# Patient Record
(demographics unavailable — no encounter records)

---
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
## 2017-03-15 NOTE — ER DOCUMENT REPORT
ED Medical Screen (RME)





- General


Stated Complaint: VOMITING


Notes: 


30 yo female c/o epigastric pain and vomiting x 30 minutes.  + hx/o lupus


pt actively vomiting.

## 2017-04-04 NOTE — ER DOCUMENT REPORT
ED Seizure





- General


Mode of Arrival: Medic


Information source: Patient, Relative - 





- HPI


Patient complains to provider of: First seizure


Time of onset: 0430


Continued on arrival to ED: No


Episode witnessed (by whom): Yes - 


Preceding symptoms/context: Recent drug use, Changed meds or dosage, Other - 

Took tramadol


Character of seizure: No: Incontinent bladder





<JANETTE SCALES - Last Filed: 04/04/17 09:24>





<LISA CABA - Last Filed: 04/04/17 21:19>





- General


Chief Complaint: Passed Out Prior to Arrival


Stated Complaint: ALTERED MENTAL STATUS


Notes: 


Patient is a 29-year-old female presenting to the emergency department via EMS 

after having a possible seizure at approximately 0430 this morning.  Patient's 

 states that he awoke to a bunch of banging in the bathroom, and he went 

to check on the patient and she began to convulse, sliding down the door with 

her hands curled.  Patient's  states that she was turning blue and her 

tongue was sticking out.  Patient's  called EMS.  Patient's  

states that when she stopped convulsing, she was sleeping, and then she began 

to convulse one more time.  At that point EMS showed up and brought her in.  

Patient's  states that while she was lying on the bathroom floor,  she 

had some blood coming out of her mouth that is now dried on her arm.  Patient 

now states that she feels very tired, and she has been having bilateral leg 

swelling and midsternal chest pain.  Patient states that she just moved here 

from [place], and she just started at a new pain management doctor for her 

rheumatoid arthritis and lupus.  Patient was put on Neurontin, steroid, and 

this morning she took a tramadol.  Patient states that the only thing she 

remembers about this morning is that she woke up to do her makeup and then the 

next thing she remembers is that she was in the ambulance. (JANETTE SCALES)





- Related Data


Allergies/Adverse Reactions: 


 





Penicillins Allergy (Verified 03/15/17 16:15)


 


prochlorperazine [From Compazine] Allergy (Verified 03/15/17 16:15)


 


promethazine [From Phenergan] Allergy (Verified 03/15/17 16:15)


 


Sulfa (Sulfonamide Antibiotics) Allergy (Verified 03/15/17 16:15)


 











Past Medical History





- General


Information source: Patient, Relative - Spouse





- Social History


Smoking Status: Unknown if Ever Smoked


Lives with: Family


Family History: Reviewed & Not Pertinent


Renal/ Medical History: Denies: Hx Peritoneal Dialysis


Musculoskeltal Medical History: Reports Hx Arthritis - RA


Surgical Hx: Negative





<JANETTE SCALES - Last Filed: 04/04/17 09:24>





Review of Systems





- Review of Systems


Constitutional: No symptoms reported


EENT: No symptoms reported


Cardiovascular: See HPI, Chest pain


Respiratory: No symptoms reported


Gastrointestinal: No symptoms reported


Genitourinary: No symptoms reported.  denies: Incontinence


Female Genitourinary: No symptoms reported


Musculoskeletal: No symptoms reported


Skin: No symptoms reported


Hematologic/Lymphatic: No symptoms reported


Neurological/Psychological: See HPI, Seizure


-: Yes All other systems reviewed and negative





<JANETTE SCALES - Last Filed: 04/04/17 09:24>





Physical Exam





- General


General appearance: Alert





- HEENT


Head: Normocephalic, Atraumatic


Eyes: Normal


Pupils: PERRL





- Respiratory


Respiratory status: No respiratory distress


Chest status: Nontender


Breath sounds: Normal


Chest palpation: Normal





- Cardiovascular


Rhythm: Regular


Heart sounds: Normal auscultation


Murmur: No





- Abdominal


Inspection: Obese


Distension: No distension


Bowel sounds: Normal


Tenderness: Nontender


Organomegaly: No organomegaly





- Back


Back: Normal, Nontender





- Extremities


General upper extremity: Normal inspection, Nontender


General lower extremity: Normal inspection, Nontender





- Neurological


Neuro grossly intact: Yes


Cognition: Normal


Orientation: AAOx4


Sim Coma Scale Eye Opening: Spontaneous


Sim Coma Scale Verbal: Oriented


Frost Coma Scale Motor: Obeys Commands


Sim Coma Scale Total: 15


Speech: Normal





- Psychological


Associated symptoms: Normal affect, Normal mood





- Skin


Skin Temperature: Warm


Skin Moisture: Dry


Skin Color: Normal





<JANETTE SCALES - Last Filed: 04/04/17 09:24>





Course





- Laboratory


Result Diagrams: 


 04/04/17 05:57





 04/04/17 05:57





<JANETTE SCALES - Last Filed: 04/04/17 09:24>





- Laboratory


Result Diagrams: 


 04/04/17 05:57





 04/04/17 05:57





<LISA CABA - Last Filed: 04/04/17 21:19>





- Re-evaluation


Re-evalutation: 


04/04/17 08:30


I personally performed the services described in the documentation, reviewed 

and edited the documentation which was dictated to my scribe in my presence, 

and it accurately records my words and actions.





states she recently moved here from out of town is out of all of her chronic 

medications for lupus chronic pain and rheumatoid arthritis a local primary 

care physician gave her some Ultram the other day and is sending her to a pain 

 which she has an appointment for. She describes an event 

that took place this morning which is somewhat inconclusive based on the 

history she says she was getting ready to put her makeup on and neck he she 

remembers is being in the ambulance.  states he heard her banging on the 

wall to get his attention and we realized she was in the bathroom he came to 

get her. He said she was sliding down the wall having potential seizure-like 

activity she was not incontinent of urine or stool. EMS reports snoring 

possibly postictal. She does not have a history of a seizure disorder told her 

stop taking the Ultram as it can predispose to it. CT of head CT of the neck 

negative acute laboratory evaluation is all negative for acute pathology with 

the exception of a urinary tract infection which gave her Macrobid for. She is 

requesting Dilaudid for headache explained to her that we don't use Dilaudid 

for headaches in the emergency department nor do we treat chronic pain in this 

emergency department going forward have her follow-up with a primary care 

physician treat her with antibiotics for urinary tract infection given the name 

and number to a neurologist to follow up with his well. She is to follow up 

with these physicians in the next 3-4 days and discussed reasons Yudelka return 

sooner





04/04/17 08:43


Patient reports the Macrobid doesn't work for her chronic UTIs prefers Keflex 

switch over to Keflex she lists an allergy to penicillin but states she can 

take Keflex. (LISA CABA)





- Vital Signs


Vital signs: 


 











Temp Pulse Resp BP Pulse Ox


 


 98.6 F   139 H  16   106/62   95 


 


 04/04/17 08:30  04/04/17 05:41  04/04/17 08:30  04/04/17 08:30  04/04/17 08:30














- Laboratory


Laboratory results interpreted by me: 


 











  04/04/17 04/04/17 04/04/17





  05:57 05:57 07:43


 


WBC  12.4 H  


 


Seg Neutrophils %  80.0 H  


 


Lymphocytes %  12.2 L  


 


Absolute Neutrophils  9.9 H  


 


Sodium   145.8 H 


 


Glucose   127 H 


 


Calcium   8.3 L 


 


AST   13 L 


 


Urine Protein    100 H


 


Urine Blood    LARGE H


 


Ur Leukocyte Esterase    TRACE H


 


Urine Ascorbic Acid    40 H














Discharge





<JANETTE SCALES - Last Filed: 04/04/17 09:24>





<LISA CABA - Last Filed: 04/04/17 21:19>





- Discharge


Clinical Impression: 


 altered mental status resolved, cephalgia





Urinary tract infection


Qualifiers:


 Urinary tract infection type: acute cystitis Hematuria presence: without 

hematuria Qualified Code(s): N30.00 - Acute cystitis without hematuria





Chronic pain


Qualifiers:


 Chronic pain type: chronic pain syndrome Qualified Code(s): G89.4 - Chronic 

pain syndrome





Condition: Stable


Disposition: HOME, SELF-CARE


Instructions:  Urinary Tract Infection ([facility])


Additional Instructions: 


possible Seizure








     Seizures may be due to drugs and alcohol, strokes, brain injury, or 

infection.  In the most common form of epilepsy, no cause can be found.  You 

will require further evaluation to determine if you have a seizure , and to 

determine whether anti-seizure medication is required.  This follow-up testing 

is important, so please call us if you encounter problems with scheduling of 

tests or appointments.


     YOU SHOULD NOT DRIVE until released to do so by your physician. The law 

requires that seizures be reported to the 's license bureau--a seizure 

while driving could be catastrophic.


     Call the doctor if seizures recur, or if you develop new symptoms such as 

fever, severe headache, stiff neck, confusion or increasing sleepiness, 

weakness or numbness, or visual problems.


Stop Ultram





Urinary Tract Infection





     Your evaluation indicates that you have a urinary tract infection. This is 

due to germs growing in the bladder.  This is a common problem.


     This infection usually responds quickly to antibiotics.  Your antibiotic 

should be taken exactly as prescribed.  Drink plenty of fluids -- three to four 

quarts a day.


     Occasionally, a bladder anesthetic will be prescribed to help stop the 

feeling of urgency until the antibiotic has a chance to clear the infection.  

This may cause your urine to be dark orange.


     Certain urine infections require a culture.  If the doctor obtained a 

culture, the results will be back in two days.  You should call to see if a 

change in treatment is needed.


     A repeat urinalysis after you finish treatment is often recommended.  The 

physician will let you know if further testing is required.


     Call the doctor if you develop fever, chills, flank pain, inability to 

urinate, or blood in the urine.





Chronic Pain Control





     Stress, inactivity, and depression make pain more severe regardless of the 

cause of the pain.  Stress and poor physical condition can cause pain such as 

headaches and backache.


     Relaxation:  Rest in a quiet place with your eyes closed for 20 minutes 

twice daily.  Concentrate on a pleasant image, or simply "feel" your breathing.

  Clear your mind.


     Stress management:  Deal with your "stressors."  Either take action, or 

eliminate the stressor from your life.  Don't let things hang over you.  Accept 

those things you can't change.


     Nutrition:  Eat small, balanced meals -- don't skip, don't overeat.  Meals 

should be high-carbohydrate, low-sugar, low-fat.


     Exercise:  Exercise helps painful conditions and eases stress. Get 30 

minutes of moderate exercise, five days a week. Do an activity that does not 

flare your pain.


     Precautions:  Pain which continues to disrupt daily activities, or which 

changes in nature, requires a medical evaluation.  Pain Clinic referral is 

available.





     


We do not manage chronic pain in the Emergency Department.  We will try to 

appropriately help you through an acute flare of your chronic painful condition

, but for on-going chronic pain that does not improve, you will need to see 

your private doctor or pain management specialist.  We do not provide repeated 

medication management of chronic painful conditions.  If you wish, we can 

provide the name of local pain management physicians.








Prescriptions: 


Cephalexin Monohydrate [Keflex 500 mg Capsule] 500 mg PO QID #20 capsule


Nitrofurantoin/Nitrofuran Mac [Macrobid 100 mg Capsule] 1 tab PO BID #20 capsule


Referrals: 


CHERELLE RENEE MD [ACTIVE STAFF] - Follow up in 3-5 days (Call his office 

today for follow-up appointment in regards to evaluating for seizures.)


FLORINDA MIR MD [ACTIVE STAFF] - Follow up in 3-5 days (Return to the ER 

sooner for increasing worsening or new symptoms)





Scribe Documentation





- Scribe


Written by Teja:: Janette Scales 04/04/2017 0724


acting as scribe for :: JANETTE Vazquez - Last Filed: 04/04/17 09:24>

## 2017-04-04 NOTE — EKG REPORT
SEVERITY:- OTHERWISE NORMAL ECG -

SINUS TACHYCARDIA

:

Confirmed by: Rhonda Gordillo MD 04-Apr-2017 13:08:27